# Patient Record
Sex: MALE | Race: WHITE | NOT HISPANIC OR LATINO
[De-identification: names, ages, dates, MRNs, and addresses within clinical notes are randomized per-mention and may not be internally consistent; named-entity substitution may affect disease eponyms.]

---

## 2020-03-04 VITALS
HEART RATE: 96 BPM | HEIGHT: 71 IN | WEIGHT: 191 LBS | DIASTOLIC BLOOD PRESSURE: 80 MMHG | BODY MASS INDEX: 26.74 KG/M2 | SYSTOLIC BLOOD PRESSURE: 130 MMHG

## 2022-08-22 ENCOUNTER — NON-APPOINTMENT (OUTPATIENT)
Age: 87
End: 2022-08-22

## 2022-08-22 DIAGNOSIS — Z87.891 PERSONAL HISTORY OF NICOTINE DEPENDENCE: ICD-10-CM

## 2022-08-22 PROBLEM — Z00.00 ENCOUNTER FOR PREVENTIVE HEALTH EXAMINATION: Status: ACTIVE | Noted: 2022-08-22

## 2022-08-22 RX ORDER — CYCLOSPORINE 0.5 MG/ML
0.05 EMULSION OPHTHALMIC TWICE DAILY
Refills: 0 | Status: ACTIVE | COMMUNITY

## 2022-08-22 RX ORDER — TAMSULOSIN HYDROCHLORIDE 0.4 MG/1
0.4 CAPSULE ORAL DAILY
Refills: 0 | Status: ACTIVE | COMMUNITY

## 2022-09-09 ENCOUNTER — APPOINTMENT (OUTPATIENT)
Dept: CARDIOLOGY | Facility: CLINIC | Age: 87
End: 2022-09-09

## 2022-09-22 ENCOUNTER — APPOINTMENT (OUTPATIENT)
Dept: CARDIOLOGY | Facility: CLINIC | Age: 87
End: 2022-09-22

## 2022-10-13 ENCOUNTER — APPOINTMENT (OUTPATIENT)
Dept: CARDIOLOGY | Facility: CLINIC | Age: 87
End: 2022-10-13

## 2022-10-13 ENCOUNTER — NON-APPOINTMENT (OUTPATIENT)
Age: 87
End: 2022-10-13

## 2022-10-13 PROCEDURE — 99204 OFFICE O/P NEW MOD 45 MIN: CPT | Mod: 95

## 2022-10-13 PROCEDURE — ZZZZZ: CPT

## 2022-10-13 NOTE — DISCUSSION/SUMMARY
[FreeTextEntry1] : get bloodwork \par pt had aortic stenosis and see me same day ss difificult \par we need to assess medication \par pt is older may need to adjust eliquis as risk of bleeding can be high \par bring a list of meds \par f/u within a month

## 2022-10-13 NOTE — HISTORY OF PRESENT ILLNESS
[FreeTextEntry1] : PT WITH HTN, PAF, HLD, MIld Aortic stenosis in 2018 pt of dr nielsen in past seeing me for first time, pt elected to do telehealth so unable to examine patient \par \par chart reviewed at length\par \par last echo: 3/18: read by dr. scruggs: LVEF 65%, DD1, LAE, MILD AS WITH PEAK AV EMILY: 2.06 m/s \par \par pt agrees to teleheatlh, pt is in wheelchair outside and walker inside, pt needs his son to bring him, reviewed all medications. \par pt says he is on sotalol and fleicanide for 17 years and was advised that fleicanide and told i need to figure it out. pt denies cp, pt does have some reflux like symptoms. \par pt mostly sedantary due to OA and osteoporosis. pt unable to stand very long due to pain. \par pt was taking naprosyn and advised increased risk of heart attack.

## 2022-12-05 LAB
ALBUMIN SERPL ELPH-MCNC: 4.6 G/DL
ALP BLD-CCNC: 88 U/L
ALT SERPL-CCNC: 13 U/L
ANION GAP SERPL CALC-SCNC: 15 MMOL/L
AST SERPL-CCNC: 23 U/L
BASOPHILS # BLD AUTO: 0.06 K/UL
BASOPHILS NFR BLD AUTO: 0.6 %
BILIRUB SERPL-MCNC: 0.7 MG/DL
BUN SERPL-MCNC: 13 MG/DL
CALCIUM SERPL-MCNC: 9.5 MG/DL
CHLORIDE SERPL-SCNC: 98 MMOL/L
CHOLEST SERPL-MCNC: 146 MG/DL
CO2 SERPL-SCNC: 26 MMOL/L
CREAT SERPL-MCNC: 0.8 MG/DL
EGFR: 87 ML/MIN/1.73M2
EOSINOPHIL # BLD AUTO: 0.23 K/UL
EOSINOPHIL NFR BLD AUTO: 2.4 %
ESTIMATED AVERAGE GLUCOSE: 131 MG/DL
GLUCOSE SERPL-MCNC: 111 MG/DL
HBA1C MFR BLD HPLC: 6.2 %
HCT VFR BLD CALC: 49.6 %
HDLC SERPL-MCNC: 28 MG/DL
HGB BLD-MCNC: 16.3 G/DL
IMM GRANULOCYTES NFR BLD AUTO: 0.7 %
LDLC SERPL CALC-MCNC: 74 MG/DL
LYMPHOCYTES # BLD AUTO: 1.94 K/UL
LYMPHOCYTES NFR BLD AUTO: 19.9 %
MAN DIFF?: NORMAL
MCHC RBC-ENTMCNC: 30.1 PG
MCHC RBC-ENTMCNC: 32.9 G/DL
MCV RBC AUTO: 91.7 FL
MONOCYTES # BLD AUTO: 0.91 K/UL
MONOCYTES NFR BLD AUTO: 9.3 %
NEUTROPHILS # BLD AUTO: 6.54 K/UL
NEUTROPHILS NFR BLD AUTO: 67.1 %
NONHDLC SERPL-MCNC: 118 MG/DL
NT-PROBNP SERPL-MCNC: 337 PG/ML
PLATELET # BLD AUTO: 247 K/UL
POTASSIUM SERPL-SCNC: 4.7 MMOL/L
PROT SERPL-MCNC: 7.2 G/DL
RBC # BLD: 5.41 M/UL
RBC # FLD: 14.9 %
SODIUM SERPL-SCNC: 139 MMOL/L
TRIGL SERPL-MCNC: 219 MG/DL
TSH SERPL-ACNC: 2.4 UIU/ML
WBC # FLD AUTO: 9.75 K/UL

## 2022-12-09 ENCOUNTER — APPOINTMENT (OUTPATIENT)
Dept: CARDIOLOGY | Facility: CLINIC | Age: 87
End: 2022-12-09

## 2022-12-09 VITALS — HEIGHT: 71 IN | WEIGHT: 190.19 LBS | BODY MASS INDEX: 26.63 KG/M2

## 2022-12-09 PROCEDURE — 99204 OFFICE O/P NEW MOD 45 MIN: CPT

## 2022-12-09 PROCEDURE — 93306 TTE W/DOPPLER COMPLETE: CPT

## 2022-12-09 RX ORDER — LISINOPRIL 20 MG/1
20 TABLET ORAL DAILY
Refills: 0 | Status: DISCONTINUED | COMMUNITY
End: 2022-12-09

## 2022-12-09 RX ORDER — SOTALOL HYDROCHLORIDE 80 MG/1
80 TABLET ORAL TWICE DAILY
Refills: 0 | Status: DISCONTINUED | COMMUNITY
End: 2022-12-09

## 2023-01-11 NOTE — DISCUSSION/SUMMARY
[FreeTextEntry1] : get bloodwork \par pt had aortic stenosis and see me same day as difificult to get around \par we need to assess medication \par pt is older may need to adjust eliquis as risk of bleeding can be high \par bring a list of meds \par f/u within a month \par \par stop sotalol as seems permanent afib, start metoprolol s\par start entresto if not expensive and start lisinopril \par f./u in 6 months \par get bloodwork \par watch diet for TG

## 2023-01-11 NOTE — HISTORY OF PRESENT ILLNESS
[FreeTextEntry1] : PT WITH HTN, PAF, HLD, MIld Aortic stenosis in 2018 pt of dr nielsen in past seeing me for first time telehealth in 2022 \par \par last echo: 3/18: read by dr. scruggs: LVEF 65%, DD1, LAE, MILD AS WITH PEAK AV EMILY: 2.06 m/s \par \par pt agrees to teleheatlh, pt is in wheelchair outside and walker inside, pt needs his son to bring him, reviewed all medications. \par pt says he is on sotalol and fleicanide for 17 years and was advised that fleicanide cannot be stopped and told i need to figure it out. pt denies cp, pt does have some reflux like symptoms. \par pt mostly sedantary due to OA and osteoporosis. pt unable to stand very long due to pain. \par pt was taking naprosyn and advised increased risk of heart attack. \par \par 12/9/22: \par bun/cr 13/0.8 GFR 87\par bnp 337 \par HDL 38 \par  \par LDL 74 \par a1c: 6.2 \par \par pt here for an initial visit, using a walker at home and lives by himself, pt said knees are his biggest problem, pt had gel shots and was recommended knee replacement and does not want to do it, pt does cooking at home, pt is in afib and echo with mild lv dysfunction. \par tg elevated \par \par \par 1/11/23: pt having pain in kidneys and swelling in his tongue, pt was dizzy as well and says improved once stopped entresto and metoprolol, he felt better on old medicine. stay off of both.

## 2023-01-11 NOTE — CARDIOLOGY SUMMARY
[de-identified] : 12/9/22: lvef 45-50% midseptal hypokinesis, DD1, mild SHE, mod LAE, mild ELAINE, mild to mod MR \par LCC AV severely restricted no AS.

## 2023-01-11 NOTE — PHYSICAL EXAM
[Normal Venous Pressure] : normal venous pressure [Normal S1, S2] : normal S1, S2 [Murmur] : murmur [Clear Lung Fields] : clear lung fields [No Edema] : no edema [de-identified] : fawad  [de-identified] : irreg irreg

## 2023-06-28 ENCOUNTER — APPOINTMENT (OUTPATIENT)
Dept: CARDIOLOGY | Facility: CLINIC | Age: 88
End: 2023-06-28
Payer: MEDICARE

## 2023-06-28 VITALS — DIASTOLIC BLOOD PRESSURE: 70 MMHG | HEART RATE: 70 BPM | SYSTOLIC BLOOD PRESSURE: 110 MMHG

## 2023-06-28 VITALS — WEIGHT: 180 LBS | BODY MASS INDEX: 25.2 KG/M2 | HEIGHT: 71 IN

## 2023-06-28 PROCEDURE — 99214 OFFICE O/P EST MOD 30 MIN: CPT

## 2023-06-28 RX ORDER — SACUBITRIL AND VALSARTAN 24; 26 MG/1; MG/1
24-26 TABLET, FILM COATED ORAL TWICE DAILY
Qty: 180 | Refills: 1 | Status: DISCONTINUED | COMMUNITY
End: 2023-06-28

## 2023-07-18 ENCOUNTER — OUTPATIENT (OUTPATIENT)
Dept: OUTPATIENT SERVICES | Facility: HOSPITAL | Age: 88
LOS: 1 days | End: 2023-07-18

## 2023-07-18 DIAGNOSIS — E78.2 MIXED HYPERLIPIDEMIA: ICD-10-CM

## 2023-07-19 DIAGNOSIS — E78.2 MIXED HYPERLIPIDEMIA: ICD-10-CM

## 2023-07-19 LAB
ALBUMIN SERPL ELPH-MCNC: 4.3 G/DL
ALP BLD-CCNC: 85 U/L
ALT SERPL-CCNC: 10 U/L
ANION GAP SERPL CALC-SCNC: 13 MMOL/L
AST SERPL-CCNC: 19 U/L
BILIRUB SERPL-MCNC: 0.5 MG/DL
BUN SERPL-MCNC: 17 MG/DL
CALCIUM SERPL-MCNC: 9.3 MG/DL
CHLORIDE SERPL-SCNC: 101 MMOL/L
CHOLEST SERPL-MCNC: 118 MG/DL
CO2 SERPL-SCNC: 26 MMOL/L
CREAT SERPL-MCNC: 0.9 MG/DL
EGFR: 84 ML/MIN/1.73M2
GLUCOSE SERPL-MCNC: 94 MG/DL
HDLC SERPL-MCNC: 28 MG/DL
LDLC SERPL CALC-MCNC: 63 MG/DL
NONHDLC SERPL-MCNC: 90 MG/DL
POTASSIUM SERPL-SCNC: 4.8 MMOL/L
PROT SERPL-MCNC: 7 G/DL
SODIUM SERPL-SCNC: 140 MMOL/L
TRIGL SERPL-MCNC: 133 MG/DL
TSH SERPL-ACNC: 1.96 UIU/ML

## 2023-07-20 LAB
ESTIMATED AVERAGE GLUCOSE: 128 MG/DL
HBA1C MFR BLD HPLC: 6.1 %
NT-PROBNP SERPL-MCNC: 586 PG/ML

## 2023-07-21 NOTE — CARDIOLOGY SUMMARY
[de-identified] : 12/9/22: lvef 45-50% midseptal hypokinesis, DD1, mild SHE, mod LAE, mild ELAINE, mild to mod MR \par LCC AV severely restricted no AS.

## 2023-07-21 NOTE — PHYSICAL EXAM
[Normal Venous Pressure] : normal venous pressure [Normal S1, S2] : normal S1, S2 [Murmur] : murmur [Clear Lung Fields] : clear lung fields [No Edema] : no edema [de-identified] : fawad  [de-identified] : irreg irreg

## 2023-07-21 NOTE — DISCUSSION/SUMMARY
[FreeTextEntry1] : get bloodwork \par pt had aortic stenosis and see me same day as difificult to get around \par we need to assess medication \par pt is older may need to adjust eliquis as risk of bleeding can be high \par bring a list of meds \par f/u within a month \par \par stop sotalol as seems permanent afib, start metoprolol s\par start entresto if not expensive and start lisinopril \par f./u in 6 months \par get blood work \par watch diet for TG \par \par 6/28/23: \par Get blood work \par Stop Flecanide\par Continue Eliquis and change sotalol twice daily instead of once \par d/w pt risk of MI \par get carotid \par f/u in 1 months get bloodwork

## 2023-07-21 NOTE — HISTORY OF PRESENT ILLNESS
[FreeTextEntry1] : PT WITH HTN, PAF, HLD, MIld Aortic stenosis in 2018 pt of dr nielsen in past on fleicanide and sotalol. \par \par 22: lvef 45-50% midseptal hypokinesis, DD1, mild SHE, mod LAE, mild ELAINE, mild to mod MR \par LCC AV severely restricted no AS. \par \par pt agrees to teleheatlh, pt is in wheelchair outside and walker inside, pt needs his son to bring him, reviewed all medications. \par pt says he is on sotalol and fleicanide for 17 years and was advised that fleicanide cannot be stopped and told i need to figure it out.\par 22: \par bun/cr 13/0.8 GFR 87bnp 337 HDL 38  LDL 74 a1c: 6.2 \par \par pt here for an initial inpatient visit, using a walker at home and lives by himself, pt said knees are his biggest problem, pt had gel shots and was recommended knee replacement and does not want to do it, pt does cooking at home, pt is in afib and echo with mild lv dysfunction. \par tg elevated \par \par 23: pt having pain in kidneys and swelling in his tongue, pt was dizzy as well and says improved once stopped entresto and metoprolol, he felt better on old medicine. stay off of both. \par \par 23:\par Patient did not get bw done \par Patient states he ran out of flecanide, pt had REACTION SWOLLEN THROAT FROM ENTRESTO AND STOPPED IT. \par Pt ran out of fleicanide 2 days ago and has a prescription. \par \par 23:\par A1C: 6.1, HDL: 28, T, LDL: 63, BNP: 586

## 2023-07-27 ENCOUNTER — APPOINTMENT (OUTPATIENT)
Dept: CARDIOLOGY | Facility: CLINIC | Age: 88
End: 2023-07-27
Payer: MEDICARE

## 2023-07-27 VITALS — HEIGHT: 71 IN | BODY MASS INDEX: 25.2 KG/M2 | WEIGHT: 180 LBS

## 2023-07-27 VITALS — HEART RATE: 80 BPM | DIASTOLIC BLOOD PRESSURE: 88 MMHG | SYSTOLIC BLOOD PRESSURE: 122 MMHG

## 2023-07-27 PROCEDURE — 99214 OFFICE O/P EST MOD 30 MIN: CPT

## 2023-07-27 RX ORDER — FLECAINIDE ACETATE 50 MG/1
50 TABLET ORAL
Qty: 360 | Refills: 3 | Status: DISCONTINUED | COMMUNITY
End: 2023-07-27

## 2023-07-27 RX ORDER — METOPROLOL SUCCINATE 50 MG/1
50 TABLET, EXTENDED RELEASE ORAL
Qty: 90 | Refills: 3 | Status: DISCONTINUED | COMMUNITY
End: 2023-07-27

## 2023-08-10 RX ORDER — LISINOPRIL 20 MG/1
20 TABLET ORAL DAILY
Qty: 90 | Refills: 3 | Status: ACTIVE | COMMUNITY
Start: 1900-01-01 | End: 1900-01-01

## 2023-08-10 NOTE — DISCUSSION/SUMMARY
[FreeTextEntry1] : 7/23: FLEICANIDE STOPPED  Continue Eliquis and increase sotalol to 120mg  pt not active as pain in the knee and needs a cortisone shot. f/u in 4 months  carotid pending  lexiscan stress nuclear as lv dysfunction  pt had problems persantine in the past.

## 2023-08-10 NOTE — PHYSICAL EXAM
[Normal Venous Pressure] : normal venous pressure [Normal S1, S2] : normal S1, S2 [Murmur] : murmur [Clear Lung Fields] : clear lung fields [No Edema] : no edema [de-identified] : fawad  [de-identified] : irreg irreg

## 2023-08-10 NOTE — CARDIOLOGY SUMMARY
[de-identified] : 12/9/22: lvef 45-50% midseptal hypokinesis, DD1, mild SHE, mod LAE, mild ELAINE, mild to mod MR \par LCC AV severely restricted no AS.

## 2023-08-10 NOTE — HISTORY OF PRESENT ILLNESS
[FreeTextEntry1] : PT WITH HTN, PAF, HLD, MIld Aortic stenosis in 2018 pt of dr nielsen in past on fleicanide and sotalol. \par \par 22: lvef 45-50% midseptal hypokinesis, DD1, mild SHE, mod LAE, mild ELAINE, mild to mod MR \par LCC AV severely restricted no AS. \par \par pt agrees to teleheatlh, pt is in wheelchair outside and walker inside, pt needs his son to bring him, reviewed all medications. \par pt says he is on sotalol and fleicanide for 17 years and was advised that fleicanide cannot be stopped and told i need to figure it out.\par 22: \par bun/cr 13/0.8 GFR 87bnp 337 HDL 38  LDL 74 a1c: 6.2 \par \par pt here for an initial inpatient visit, using a walker at home and lives by himself, pt said knees are his biggest problem, pt had gel shots and was recommended knee replacement and does not want to do it, pt does cooking at home, pt is in afib and echo with mild lv dysfunction. \par tg elevated \par \par 23: pt having pain in kidneys and swelling in his tongue, pt was dizzy as well and says improved once stopped entresto and metoprolol, he felt better on old medicine. stay off of both. \par \par 23:\par Patient did not get bw done \par Patient states he ran out of flecanide, pt had REACTION SWOLLEN THROAT FROM ENTRESTO AND STOPPED IT. \par Pt ran out of fleicanide 2 days ago and has a prescription. \par \par 23:\par A1C: 6.1, HDL: 28, T, LDL: 63, BNP: 586 GFR: 84 \par carotid \par Patient is in Afib. Patient states he has been less active due to knee pain hes going for cortisone shots and using a walker. Patient sob says intermittent at times. PT STOPPED FLECAINIDE. Denies any bleeding with Eliquis.

## 2023-08-29 ENCOUNTER — APPOINTMENT (OUTPATIENT)
Dept: CARDIOLOGY | Facility: CLINIC | Age: 88
End: 2023-08-29

## 2023-11-30 ENCOUNTER — APPOINTMENT (OUTPATIENT)
Dept: CARDIOLOGY | Facility: CLINIC | Age: 88
End: 2023-11-30
Payer: MEDICARE

## 2023-11-30 VITALS — WEIGHT: 190 LBS | BODY MASS INDEX: 26.6 KG/M2 | HEIGHT: 71 IN

## 2023-11-30 DIAGNOSIS — I48.0 PAROXYSMAL ATRIAL FIBRILLATION: ICD-10-CM

## 2023-11-30 DIAGNOSIS — I50.22 CHRONIC SYSTOLIC (CONGESTIVE) HEART FAILURE: ICD-10-CM

## 2023-11-30 DIAGNOSIS — I35.0 NONRHEUMATIC AORTIC (VALVE) STENOSIS: ICD-10-CM

## 2023-11-30 DIAGNOSIS — I10 ESSENTIAL (PRIMARY) HYPERTENSION: ICD-10-CM

## 2023-11-30 DIAGNOSIS — R73.03 PREDIABETES.: ICD-10-CM

## 2023-11-30 DIAGNOSIS — E78.2 MIXED HYPERLIPIDEMIA: ICD-10-CM

## 2023-11-30 DIAGNOSIS — I65.23 OCCLUSION AND STENOSIS OF BILATERAL CAROTID ARTERIES: ICD-10-CM

## 2023-11-30 PROCEDURE — 93000 ELECTROCARDIOGRAM COMPLETE: CPT

## 2023-11-30 PROCEDURE — 99214 OFFICE O/P EST MOD 30 MIN: CPT | Mod: 25

## 2023-11-30 RX ORDER — METOPROLOL SUCCINATE 50 MG/1
50 TABLET, EXTENDED RELEASE ORAL
Qty: 90 | Refills: 3 | Status: ACTIVE | COMMUNITY
Start: 2023-11-30 | End: 1900-01-01

## 2023-11-30 RX ORDER — METOPROLOL TARTRATE 100 MG/1
100 TABLET, FILM COATED ORAL
Qty: 2 | Refills: 0 | Status: ACTIVE | COMMUNITY
Start: 2023-11-30 | End: 1900-01-01

## 2023-11-30 RX ORDER — SOTALOL HYDROCHLORIDE 80 MG/1
80 TABLET ORAL
Qty: 180 | Refills: 3 | Status: ACTIVE | COMMUNITY
Start: 1900-01-01 | End: 1900-01-01

## 2023-12-22 NOTE — PHYSICAL EXAM
[Normal Venous Pressure] : normal venous pressure [Normal S1, S2] : normal S1, S2 [Murmur] : murmur [Clear Lung Fields] : clear lung fields [No Edema] : no edema [de-identified] : fawad  [de-identified] : irreg irreg

## 2023-12-22 NOTE — CARDIOLOGY SUMMARY
[de-identified] : 12/9/22: lvef 45-50% midseptal hypokinesis, DD1, mild SHE, mod LAE, mild ELAINE, mild to mod MR \par  LCC AV severely restricted no AS.

## 2023-12-22 NOTE — DISCUSSION/SUMMARY
[FreeTextEntry1] : 7/23: FLEICANIDE STOPPED  Continue Eliquis and continue sotalol reduce sotalol to 80 q12  get metoprolol er 50 mg po qd  f/u in 1 month  pt tired and not very active from OA  pt says cortisone did not help knee  get CTA as lv dysufnction as problems with persantine and high risk CAD  get 2 d echo  QTC is 503,

## 2023-12-22 NOTE — HISTORY OF PRESENT ILLNESS
[FreeTextEntry1] : PT WITH HTN, PAF, HLD, MIld Aortic stenosis in 2018 pt of dr nielsen in past on fleicanide and sotalol.   22: lvef 45-50% midseptal hypokinesis, DD1, mild SHE, mod LAE, mild ELAINE, mild to mod MR  LCC AV severely restricted no AS.   pt agrees to teleheatlh, pt is in wheelchair outside and walker inside, pt needs his son to bring him, reviewed all medications.  pt says he is on sotalol and fleicanide for 17 years and was advised that fleicanide cannot be stopped and told i need to figure it out. 22:  bun/cr 13/0.8 GFR 87bnp 337 HDL 38  LDL 74 a1c: 6.2   pt here for an initial inpatient visit, using a walker at home and lives by himself, pt said knees are his biggest problem, pt had gel shots and was recommended knee replacement and does not want to do it, pt does cooking at home, pt is in afib and echo with mild lv dysfunction.  tg elevated   23: pt having pain in kidneys and swelling in his tongue, pt was dizzy as well and says improved once stopped entresto and metoprolol, he felt better on old medicine. stay off of both.   23: Patient did not get bw done  Patient states he ran out of flecanide, pt had REACTION SWOLLEN THROAT FROM ENTRESTO AND STOPPED IT.  Pt ran out of fleicanide 2 days ago and has a prescription.   23: A1C: 6.1, HDL: 28, T, LDL: 63, BNP: 586 GFR: 84  carotid  Patient is in Afib. Patient states he has been less active due to knee pain hes going for cortisone shots and using a walker. Patient sob says intermittent at times. PT STOPPED FLECAINIDE. Denies any bleeding with Eliquis.   23: stopped flecainide last visit. pt had ISSUES PERSANTINE, and needed stress for lv dysfunction did not get. did not do carotids as low HDL chronic  pt HAS NOT DIFFERENCE NOT ON FLECAINIDE, Pt feeling tired mostly sedantary. pt hr on faster side.   23:  Echo before appointment.

## 2023-12-28 ENCOUNTER — APPOINTMENT (OUTPATIENT)
Dept: CARDIOLOGY | Facility: CLINIC | Age: 88
End: 2023-12-28

## 2024-02-06 ENCOUNTER — RX RENEWAL (OUTPATIENT)
Age: 89
End: 2024-02-06

## 2024-02-06 RX ORDER — APIXABAN 5 MG/1
5 TABLET, FILM COATED ORAL
Qty: 180 | Refills: 3 | Status: ACTIVE | COMMUNITY
Start: 1900-01-01 | End: 1900-01-01

## 2024-03-11 RX ORDER — AMLODIPINE BESYLATE 5 MG/1
5 TABLET ORAL DAILY
Qty: 90 | Refills: 3 | Status: ACTIVE | COMMUNITY
Start: 1900-01-01 | End: 1900-01-01

## 2024-08-27 ENCOUNTER — RX RENEWAL (OUTPATIENT)
Age: 89
End: 2024-08-27

## 2024-09-05 ENCOUNTER — RX RENEWAL (OUTPATIENT)
Age: 89
End: 2024-09-05

## 2024-09-05 RX ORDER — SOTALOL HYDROCHLORIDE 120 MG/1
120 TABLET ORAL
Qty: 180 | Refills: 3 | Status: ACTIVE | COMMUNITY
Start: 2024-09-05 | End: 1900-01-01

## 2025-04-11 ENCOUNTER — RX RENEWAL (OUTPATIENT)
Age: 89
End: 2025-04-11

## 2025-06-05 ENCOUNTER — APPOINTMENT (OUTPATIENT)
Dept: CARDIOLOGY | Facility: CLINIC | Age: 89
End: 2025-06-05
Payer: MEDICARE

## 2025-06-05 DIAGNOSIS — I10 ESSENTIAL (PRIMARY) HYPERTENSION: ICD-10-CM

## 2025-06-05 DIAGNOSIS — E78.2 MIXED HYPERLIPIDEMIA: ICD-10-CM

## 2025-06-05 DIAGNOSIS — I65.23 OCCLUSION AND STENOSIS OF BILATERAL CAROTID ARTERIES: ICD-10-CM

## 2025-06-05 DIAGNOSIS — I50.22 CHRONIC SYSTOLIC (CONGESTIVE) HEART FAILURE: ICD-10-CM

## 2025-06-05 DIAGNOSIS — I35.0 NONRHEUMATIC AORTIC (VALVE) STENOSIS: ICD-10-CM

## 2025-06-05 DIAGNOSIS — I48.0 PAROXYSMAL ATRIAL FIBRILLATION: ICD-10-CM

## 2025-06-05 DIAGNOSIS — R73.03 PREDIABETES.: ICD-10-CM

## 2025-06-05 PROCEDURE — 99212 OFFICE O/P EST SF 10 MIN: CPT | Mod: 93

## 2025-06-09 ENCOUNTER — RX RENEWAL (OUTPATIENT)
Age: 89
End: 2025-06-09

## 2025-06-20 ENCOUNTER — RX RENEWAL (OUTPATIENT)
Age: 89
End: 2025-06-20

## 2025-08-22 ENCOUNTER — RX RENEWAL (OUTPATIENT)
Age: 89
End: 2025-08-22